# Patient Record
Sex: MALE | Race: NATIVE HAWAIIAN OR OTHER PACIFIC ISLANDER | NOT HISPANIC OR LATINO
[De-identification: names, ages, dates, MRNs, and addresses within clinical notes are randomized per-mention and may not be internally consistent; named-entity substitution may affect disease eponyms.]

---

## 2020-07-27 ENCOUNTER — APPOINTMENT (OUTPATIENT)
Dept: UROLOGY | Facility: CLINIC | Age: 50
End: 2020-07-27
Payer: COMMERCIAL

## 2020-07-27 VITALS
HEIGHT: 72 IN | WEIGHT: 195 LBS | RESPIRATION RATE: 14 BRPM | HEART RATE: 78 BPM | TEMPERATURE: 98.3 F | DIASTOLIC BLOOD PRESSURE: 86 MMHG | BODY MASS INDEX: 26.41 KG/M2 | SYSTOLIC BLOOD PRESSURE: 130 MMHG | OXYGEN SATURATION: 98 %

## 2020-07-27 DIAGNOSIS — Z83.3 FAMILY HISTORY OF DIABETES MELLITUS: ICD-10-CM

## 2020-07-27 DIAGNOSIS — Z82.49 FAMILY HISTORY OF ISCHEMIC HEART DISEASE AND OTHER DISEASES OF THE CIRCULATORY SYSTEM: ICD-10-CM

## 2020-07-27 DIAGNOSIS — Z86.79 PERSONAL HISTORY OF OTHER DISEASES OF THE CIRCULATORY SYSTEM: ICD-10-CM

## 2020-07-27 DIAGNOSIS — Z86.39 PERSONAL HISTORY OF OTHER ENDOCRINE, NUTRITIONAL AND METABOLIC DISEASE: ICD-10-CM

## 2020-07-27 PROCEDURE — 99204 OFFICE O/P NEW MOD 45 MIN: CPT

## 2020-07-27 RX ORDER — ATORVASTATIN CALCIUM 80 MG/1
TABLET, FILM COATED ORAL
Refills: 0 | Status: ACTIVE | COMMUNITY

## 2020-07-27 NOTE — REVIEW OF SYSTEMS
[Feeling Tired] : feeling tired [Chest Pain] : chest pain [Poor quality erections] : Poor quality erections [Heartburn] : heartburn [Wake up at night to urinate  How many times?  ___] : wakes up to urinate [unfilled] times during the night [Told you have blood in urine on a urine test] : told blood was present in a urine test [Joint Pain] : joint pain [Slow urine stream] : slow urine stream [Depression] : depression [Joint Swelling] : joint swelling [Limb Weakness] : limb weakness [Swollen Glands] : swollen glands [Muscle Weakness] : muscle weakness [Negative] : Integumentary [see HPI] : see HPI [Erectile Dysfunction] : erectile dysfunction

## 2020-07-27 NOTE — LETTER BODY
[Consult Letter:] : I had the pleasure of evaluating your patient, [unfilled]. [Dear  ___] : Dear  [unfilled], [FreeTextEntry1] : Address: [address] [place] [address] [address], [place] [address]\par Phone: [phone] [Please see my note below.] : Please see my note below.

## 2020-07-27 NOTE — HISTORY OF PRESENT ILLNESS
[FreeTextEntry1] : He is a 50-year-old man who is seen today for initial visit. Around February 2020, he underwent a CT scan for rib fracture. It showed a large prostate about 5 cm and normal kidneys. He has nocturia 2-3 times, urinary urgency, slow and interrupted urinary stream and sensation of double voiding. Residual urine today was 67 cc. He also has difficulty maintaining erections. Desire is normal. PSA level was 2.9 in July 2020. He has history of hyperlipidemia and hypertension.

## 2020-07-27 NOTE — ASSESSMENT
[FreeTextEntry1] : Based on office ultrasound he has enlarged prostate with an intravesical portion which could be contributing to his urinary symptoms. We had a long discussion regarding patient's urinary symptoms. Initial workup with cystoscopy, determination of urinary flow rate, post void residual volume and urodynamic study was discussed. We discussed conservative management without using medications such as controlling constipation, limiting fluids before bed-time, and limiting irritating substances such as coffee, tea, alcohol, spicy foods, etc. We also discussed medication therapy for treatment of urinary symptoms with alpha-blocker therapy (such as Flomax, Rapaflo), 5 alpha reductase inhibitors (such as Proscar and Avodart), Cialis 5 mg daily especially if ED is present, anticholinergic medications (such as VESIcare, Toviaz), Myrbetriq or a combination of the above medications. Treatment of overactive bladder symptoms with Botox intravesical injections was reviewed. Also surgical treatment options such as office microwave thermotherapy, photo-vaporization of the prostate (Greenlight laser), TURP or suprapubic prostatectomy based on the size of the prostate, were discussed and side effects reviewed. Questions were answered. For patients who also have complaints of erectile dysfunction, daily Cialis 5 mg may be suitable for treating both urinary symptoms and erectile dysfunction. A prescription was submitted to a local pharmacy. Side effects reviewed. Followup in 2-3 months to assess response.\par \par Vance Rivera MD, FACS\par [facility]\par  of Urology\par \par [address], [address]\[place] [address]\par \par [address]\[place] [address]\par \par Tel: [phone]\par Fax: [phone]

## 2020-07-27 NOTE — PHYSICAL EXAM
[General Appearance - Well Developed] : well developed [General Appearance - Well Nourished] : well nourished [Normal Appearance] : normal appearance [Well Groomed] : well groomed [Edema] : no peripheral edema [General Appearance - In No Acute Distress] : no acute distress [Respiration, Rhythm And Depth] : normal respiratory rhythm and effort [Exaggerated Use Of Accessory Muscles For Inspiration] : no accessory muscle use [Abdomen Soft] : soft [Costovertebral Angle Tenderness] : no ~M costovertebral angle tenderness [Urethral Meatus] : meatus normal [Abdomen Tenderness] : non-tender [Scrotum] : the scrotum was normal [Urinary Bladder Findings] : the bladder was normal on palpation [Penis Abnormality] : normal uncircumcised penis [Prostate Tenderness] : the prostate was not tender [Testes Tenderness] : no tenderness of the testes [Testes Mass (___cm)] : there were no testicular masses [No Prostate Nodules] : no prostate nodules [Prostate Enlarged] : was enlarged [] : no rash [Normal Station and Gait] : the gait and station were normal for the patient's age [Affect] : the affect was normal [No Focal Deficits] : no focal deficits [Oriented To Time, Place, And Person] : oriented to person, place, and time [Not Anxious] : not anxious [Mood] : the mood was normal [Inguinal Lymph Nodes Enlarged Bilaterally] : inguinal

## 2020-10-12 ENCOUNTER — APPOINTMENT (OUTPATIENT)
Dept: UROLOGY | Facility: CLINIC | Age: 50
End: 2020-10-12
Payer: COMMERCIAL

## 2020-10-12 VITALS
OXYGEN SATURATION: 96 % | HEIGHT: 72 IN | TEMPERATURE: 98 F | BODY MASS INDEX: 26.41 KG/M2 | SYSTOLIC BLOOD PRESSURE: 144 MMHG | HEART RATE: 77 BPM | DIASTOLIC BLOOD PRESSURE: 94 MMHG | RESPIRATION RATE: 15 BRPM | WEIGHT: 195 LBS

## 2020-10-12 PROCEDURE — 99213 OFFICE O/P EST LOW 20 MIN: CPT

## 2020-10-12 NOTE — ASSESSMENT
[FreeTextEntry1] : urinary symptoms have improved significantly. Residual urine volume is stable. Also he has noticed change in erections. He could increase Cialis to 10 mg when sexually active and skip the next day. He can follow up in 6 months. He will continue with Cialis 5 mg daily for now. Initially he had noticed some nausea which has improved.\par \par Vance Rivera MD, FACS\par [facility]\par  of Urology\par [address] [place] [address], [address]\[place] [address]\par Tel: [phone]\par Fax: [phone]\par

## 2020-10-12 NOTE — LETTER BODY
[Dear  ___] : Dear  [unfilled], [Consult Letter:] : I had the pleasure of evaluating your patient, [unfilled]. [Please see my note below.] : Please see my note below. [FreeTextEntry1] : Address: [address] [place] [address] [address], [place] [address]\par Phone: [phone]

## 2020-10-12 NOTE — HISTORY OF PRESENT ILLNESS
[FreeTextEntry1] : He is a 50-year-old man who is seen today in follow-up. Around July 2020, he was started on Cialis 5 mg daily. Nocturia has improved significantly. He does not get up to urinate any longer. Urinary flow and stream has improved. Residual urine today was unchanged, 60 cc. Also he has noticed spontaneous erections.\par Previous notes: Around February 2020, he underwent a CT scan for rib fracture. It showed a large prostate about 5 cm and normal kidneys. He had nocturia 2-3 times, urinary urgency, slow and interrupted urinary stream and sensation of double voiding. He also has difficulty maintaining erections. Desire is normal. PSA level was 2.9 in July 2020. He has history of hyperlipidemia and hypertension.

## 2020-10-12 NOTE — PHYSICAL EXAM
[General Appearance - Well Developed] : well developed [General Appearance - Well Nourished] : well nourished [Normal Appearance] : normal appearance [Well Groomed] : well groomed [General Appearance - In No Acute Distress] : no acute distress [Abdomen Soft] : soft [Abdomen Tenderness] : non-tender [Costovertebral Angle Tenderness] : no ~M costovertebral angle tenderness [Urethral Meatus] : meatus normal [Penis Abnormality] : normal uncircumcised penis [Urinary Bladder Findings] : the bladder was normal on palpation [Scrotum] : the scrotum was normal [Testes Tenderness] : no tenderness of the testes [Testes Mass (___cm)] : there were no testicular masses [Prostate Tenderness] : the prostate was not tender [No Prostate Nodules] : no prostate nodules [Prostate Enlarged] : was enlarged [FreeTextEntry1] : SOFÍA done July 2020 [] : no respiratory distress [Respiration, Rhythm And Depth] : normal respiratory rhythm and effort [Exaggerated Use Of Accessory Muscles For Inspiration] : no accessory muscle use [Oriented To Time, Place, And Person] : oriented to person, place, and time [Affect] : the affect was normal [Mood] : the mood was normal [Not Anxious] : not anxious

## 2021-04-14 ENCOUNTER — APPOINTMENT (OUTPATIENT)
Dept: UROLOGY | Facility: CLINIC | Age: 51
End: 2021-04-14
Payer: COMMERCIAL

## 2021-04-14 VITALS
RESPIRATION RATE: 14 BRPM | HEART RATE: 62 BPM | WEIGHT: 195 LBS | HEIGHT: 72 IN | TEMPERATURE: 98 F | OXYGEN SATURATION: 99 % | SYSTOLIC BLOOD PRESSURE: 153 MMHG | DIASTOLIC BLOOD PRESSURE: 94 MMHG | BODY MASS INDEX: 26.41 KG/M2

## 2021-04-14 PROCEDURE — 99072 ADDL SUPL MATRL&STAF TM PHE: CPT

## 2021-04-14 PROCEDURE — 99213 OFFICE O/P EST LOW 20 MIN: CPT

## 2021-04-14 NOTE — PHYSICAL EXAM
[General Appearance - Well Developed] : well developed [General Appearance - Well Nourished] : well nourished [Normal Appearance] : normal appearance [Well Groomed] : well groomed [General Appearance - In No Acute Distress] : no acute distress [Abdomen Soft] : soft [Abdomen Tenderness] : non-tender [Costovertebral Angle Tenderness] : no ~M costovertebral angle tenderness [Urethral Meatus] : meatus normal [Penis Abnormality] : normal uncircumcised penis [Urinary Bladder Findings] : the bladder was normal on palpation [Scrotum] : the scrotum was normal [Testes Tenderness] : no tenderness of the testes [Testes Mass (___cm)] : there were no testicular masses [Prostate Tenderness] : the prostate was not tender [No Prostate Nodules] : no prostate nodules [Prostate Enlarged] : was enlarged [FreeTextEntry1] : SOFÍA done July 2020.

## 2021-04-14 NOTE — ASSESSMENT
[FreeTextEntry1] : He seems to be doing well specially from urinary standpoint with Cialis 5 mg daily.  Prescription was refilled for him.  He is due for repeat labs by his primary care physician in the next few months with his annual physical.  He could increase Cialis to 10 to 20 mg when sexually active and skip the next day.  Follow-up in 6 to 9 months.\par \par Vance Rivera MD, FACS\par [facility]\par  of Urology\par \par [address], [address]\[place] [address]\par \par [address] [facility], [address]\[place] [address]\par \par Tel: [phone]\par Fax: [phone]

## 2021-04-14 NOTE — LETTER BODY
[Dear  ___] : Dear  [unfilled], [Consult Letter:] : I had the pleasure of evaluating your patient, [unfilled]. [Please see my note below.] : Please see my note below. [FreeTextEntry1] : Address: [address] [place] [address] [address], [place] [address]\par Phone: [phone]

## 2021-11-17 ENCOUNTER — APPOINTMENT (OUTPATIENT)
Dept: UROLOGY | Facility: CLINIC | Age: 51
End: 2021-11-17
Payer: COMMERCIAL

## 2021-11-17 VITALS
OXYGEN SATURATION: 100 % | RESPIRATION RATE: 15 BRPM | SYSTOLIC BLOOD PRESSURE: 170 MMHG | DIASTOLIC BLOOD PRESSURE: 96 MMHG | WEIGHT: 195 LBS | HEIGHT: 72 IN | HEART RATE: 65 BPM | TEMPERATURE: 97.6 F | BODY MASS INDEX: 26.41 KG/M2

## 2021-11-17 DIAGNOSIS — Z87.898 PERSONAL HISTORY OF OTHER SPECIFIED CONDITIONS: ICD-10-CM

## 2021-11-17 PROCEDURE — 99213 OFFICE O/P EST LOW 20 MIN: CPT

## 2021-11-17 PROCEDURE — 51736 URINE FLOW MEASUREMENT: CPT

## 2021-11-17 RX ORDER — AMLODIPINE AND OLMESARTAN MEDOXOMIL 10; 40 MG/1; MG/1
TABLET ORAL
Refills: 0 | Status: DISCONTINUED | COMMUNITY
End: 2021-11-17

## 2021-11-17 RX ORDER — LISINOPRIL 30 MG/1
TABLET ORAL
Refills: 0 | Status: DISCONTINUED | COMMUNITY
End: 2021-11-17

## 2021-11-17 RX ORDER — VALSARTAN 160 MG/1
160 TABLET, COATED ORAL
Refills: 0 | Status: ACTIVE | COMMUNITY

## 2021-11-17 NOTE — PHYSICAL EXAM
[General Appearance - Well Developed] : well developed [General Appearance - Well Nourished] : well nourished [Normal Appearance] : normal appearance [Well Groomed] : well groomed [General Appearance - In No Acute Distress] : no acute distress [Abdomen Soft] : soft [Abdomen Tenderness] : non-tender [Costovertebral Angle Tenderness] : no ~M costovertebral angle tenderness [Urethral Meatus] : meatus normal [Penis Abnormality] : normal uncircumcised penis [Urinary Bladder Findings] : the bladder was normal on palpation [Scrotum] : the scrotum was normal [Testes Tenderness] : no tenderness of the testes [Testes Mass (___cm)] : there were no testicular masses [Prostate Tenderness] : the prostate was not tender [No Prostate Nodules] : no prostate nodules [Prostate Enlarged] : was enlarged [FreeTextEntry1] : SOFÍA done in 2020. [] : no respiratory distress [Respiration, Rhythm And Depth] : normal respiratory rhythm and effort [Exaggerated Use Of Accessory Muscles For Inspiration] : no accessory muscle use [Oriented To Time, Place, And Person] : oriented to person, place, and time [Affect] : the affect was normal [Mood] : the mood was normal [Not Anxious] : not anxious

## 2021-11-17 NOTE — LETTER BODY
[Dear  ___] : Dear  [unfilled], [Consult Letter:] : I had the pleasure of evaluating your patient, [unfilled]. [Please see my note below.] : Please see my note below. [FreeTextEntry1] : Address: [address] [place] [address] [address], [place] [address]\par Phone: [phone]

## 2021-11-17 NOTE — ASSESSMENT
[FreeTextEntry1] : He will send me a copy of his recent blood work.  He is trying to get his blood pressure under control.  Previous PSA level was reviewed.  Tadalafil was renewed for him and he will continue with 5 mg daily.  Uroflow was discussed.  He can follow-up in 6 to 9 months.\par \par Vance Rivera MD, FACS\par [facility]\par  of Urology\par \par [address] [facility], [address]\[place] [address]\par \par [address] [facility], [address]\[place] [address]\par \par Tel: [phone]\par Fax: [phone]

## 2021-11-17 NOTE — HISTORY OF PRESENT ILLNESS
[FreeTextEntry1] : He is a 51-year-old man who is seen today in follow-up for urinary symptoms and erectile dysfunction.  He has not been sexually very active recently.  He is on tadalafil 5 mg daily.  Nocturia is once.  Sometimes he urinates frequently in the morning.  He believes that recent blood work for his annual physical was normal.  Uroflow today showed maximum 8, average 4 mL/s, voided 160 cc and the residual urine volume was 120 cc.  Tadalafil has improved erections.\par \par Previous notes: Around February 2020, he underwent a CT scan for rib fracture. It showed a large prostate about 5 cm and normal kidneys. He had nocturia 2-3 times, urinary urgency, slow and interrupted urinary stream and sensation of double voiding. He also had difficulty maintaining erections. Desire is normal. PSA level was 2.9 in July 2020. He has history of hyperlipidemia and hypertension.

## 2022-08-24 ENCOUNTER — APPOINTMENT (OUTPATIENT)
Dept: UROLOGY | Facility: CLINIC | Age: 52
End: 2022-08-24

## 2022-08-24 PROCEDURE — 99213 OFFICE O/P EST LOW 20 MIN: CPT

## 2022-08-24 NOTE — PHYSICAL EXAM
[Urethral Meatus] : meatus normal [Penis Abnormality] : normal uncircumcised penis [Urinary Bladder Findings] : the bladder was normal on palpation [Scrotum] : the scrotum was normal [Testes Tenderness] : no tenderness of the testes [Testes Mass (___cm)] : there were no testicular masses [Prostate Tenderness] : the prostate was not tender [No Prostate Nodules] : no prostate nodules [Prostate Enlarged] : was enlarged [General Appearance - Well Developed] : well developed [General Appearance - Well Nourished] : well nourished [Normal Appearance] : normal appearance [Well Groomed] : well groomed [General Appearance - In No Acute Distress] : no acute distress [Abdomen Soft] : soft [Abdomen Tenderness] : non-tender [Costovertebral Angle Tenderness] : no ~M costovertebral angle tenderness [FreeTextEntry1] : Prostate mildly enlarged. [] : no respiratory distress [Respiration, Rhythm And Depth] : normal respiratory rhythm and effort [Exaggerated Use Of Accessory Muscles For Inspiration] : no accessory muscle use [Oriented To Time, Place, And Person] : oriented to person, place, and time [Affect] : the affect was normal [Mood] : the mood was normal [Not Anxious] : not anxious

## 2022-08-24 NOTE — LETTER BODY
[Dear  ___] : Dear  [unfilled], [Consult Letter:] : I had the pleasure of evaluating your patient, [unfilled]. [Please see my note below.] : Please see my note below. [FreeTextEntry1] : Address: [address] [place] [address] [address], [place] [address]\par Phone: [phone]

## 2022-08-24 NOTE — HISTORY OF PRESENT ILLNESS
[FreeTextEntry1] : He is a 52-year-old man who is seen today in follow-up for urinary symptoms and erectile dysfunction.  He continues to respond well to tadalafil 5 mg daily.  He has no nocturia now.  Residual urine volume today was less than 50 mL.  He is repeating PSA level today.  Tadalafil is helping with urination and also erectile dysfunction.\par Uroflow previously showed maximum 8, average 4 mL/s, voided 160 cc and the residual urine volume was 120 cc. \par \par Previous notes: Around February 2020, he underwent a CT scan for rib fracture. It showed a large prostate about 5 cm and normal kidneys. He had nocturia 2-3 times, urinary urgency, slow and interrupted urinary stream and sensation of double voiding. He also had difficulty maintaining erections. Desire is normal. PSA level was 2.9 in July 2020. He has history of hyperlipidemia and hypertension.

## 2022-08-24 NOTE — ASSESSMENT
[FreeTextEntry1] : His examination is unchanged.  Residual urine volume is acceptable.  He is responding very well to tadalafil 5 mg daily.  Prescription was refilled.  He will follow-up in 6 to 9 months.\par \par Vance Rivera MD, FACS\par [facility]\par  of Urology\par \par [address] [facility] [facility], [address]\[place] [address]\par \par [address] [facility], [address]\[place] [address]\par \par Tel: [phone]\par Fax: [phone]

## 2022-08-25 LAB — PSA SERPL-MCNC: 4.25 NG/ML

## 2022-11-03 ENCOUNTER — OUTPATIENT (OUTPATIENT)
Dept: OUTPATIENT SERVICES | Facility: HOSPITAL | Age: 52
LOS: 1 days | End: 2022-11-03
Payer: SELF-PAY

## 2022-11-03 ENCOUNTER — RESULT REVIEW (OUTPATIENT)
Age: 52
End: 2022-11-03

## 2022-11-03 ENCOUNTER — APPOINTMENT (OUTPATIENT)
Dept: MRI IMAGING | Facility: IMAGING CENTER | Age: 52
End: 2022-11-03

## 2022-11-03 DIAGNOSIS — R97.20 ELEVATED PROSTATE SPECIFIC ANTIGEN [PSA]: ICD-10-CM

## 2022-11-03 PROCEDURE — 76498P: CUSTOM | Mod: 26

## 2022-11-03 PROCEDURE — 72197 MRI PELVIS W/O & W/DYE: CPT

## 2022-11-03 PROCEDURE — 72197 MRI PELVIS W/O & W/DYE: CPT | Mod: 26

## 2022-11-03 PROCEDURE — A9585: CPT

## 2022-11-03 PROCEDURE — 76498 UNLISTED MR PROCEDURE: CPT

## 2022-11-07 ENCOUNTER — NON-APPOINTMENT (OUTPATIENT)
Age: 52
End: 2022-11-07

## 2023-04-10 ENCOUNTER — EMERGENCY (EMERGENCY)
Facility: HOSPITAL | Age: 53
LOS: 1 days | Discharge: HOME | End: 2023-04-10
Attending: EMERGENCY MEDICINE | Admitting: EMERGENCY MEDICINE
Payer: COMMERCIAL

## 2023-04-10 VITALS
DIASTOLIC BLOOD PRESSURE: 87 MMHG | TEMPERATURE: 99 F | RESPIRATION RATE: 18 BRPM | HEART RATE: 80 BPM | OXYGEN SATURATION: 100 % | SYSTOLIC BLOOD PRESSURE: 145 MMHG

## 2023-04-10 LAB
ALBUMIN SERPL ELPH-MCNC: 4.3 G/DL — SIGNIFICANT CHANGE UP (ref 3.3–5)
ALP SERPL-CCNC: 107 U/L — SIGNIFICANT CHANGE UP (ref 40–120)
ALT FLD-CCNC: 30 U/L — SIGNIFICANT CHANGE UP (ref 4–41)
ANION GAP SERPL CALC-SCNC: 10 MMOL/L — SIGNIFICANT CHANGE UP (ref 7–14)
APTT BLD: 30.2 SEC — SIGNIFICANT CHANGE UP (ref 27–36.3)
AST SERPL-CCNC: 19 U/L — SIGNIFICANT CHANGE UP (ref 4–40)
BILIRUB SERPL-MCNC: 0.3 MG/DL — SIGNIFICANT CHANGE UP (ref 0.2–1.2)
BUN SERPL-MCNC: 18 MG/DL — SIGNIFICANT CHANGE UP (ref 7–23)
CALCIUM SERPL-MCNC: 9.4 MG/DL — SIGNIFICANT CHANGE UP (ref 8.4–10.5)
CHLORIDE SERPL-SCNC: 107 MMOL/L — SIGNIFICANT CHANGE UP (ref 98–107)
CO2 SERPL-SCNC: 24 MMOL/L — SIGNIFICANT CHANGE UP (ref 22–31)
CREAT SERPL-MCNC: 1.25 MG/DL — SIGNIFICANT CHANGE UP (ref 0.5–1.3)
EGFR: 69 ML/MIN/1.73M2 — SIGNIFICANT CHANGE UP
GLUCOSE SERPL-MCNC: 88 MG/DL — SIGNIFICANT CHANGE UP (ref 70–99)
HCT VFR BLD CALC: 47.9 % — SIGNIFICANT CHANGE UP (ref 39–50)
HGB BLD-MCNC: 15.1 G/DL — SIGNIFICANT CHANGE UP (ref 13–17)
INR BLD: 1 RATIO — SIGNIFICANT CHANGE UP (ref 0.88–1.16)
MCHC RBC-ENTMCNC: 25.9 PG — LOW (ref 27–34)
MCHC RBC-ENTMCNC: 31.5 GM/DL — LOW (ref 32–36)
MCV RBC AUTO: 82.2 FL — SIGNIFICANT CHANGE UP (ref 80–100)
NRBC # BLD: 0 /100 WBCS — SIGNIFICANT CHANGE UP (ref 0–0)
NRBC # FLD: 0 K/UL — SIGNIFICANT CHANGE UP (ref 0–0)
PLATELET # BLD AUTO: 304 K/UL — SIGNIFICANT CHANGE UP (ref 150–400)
POTASSIUM SERPL-MCNC: 3.9 MMOL/L — SIGNIFICANT CHANGE UP (ref 3.5–5.3)
POTASSIUM SERPL-SCNC: 3.9 MMOL/L — SIGNIFICANT CHANGE UP (ref 3.5–5.3)
PROT SERPL-MCNC: 7.2 G/DL — SIGNIFICANT CHANGE UP (ref 6–8.3)
PROTHROM AB SERPL-ACNC: 11.6 SEC — SIGNIFICANT CHANGE UP (ref 10.5–13.4)
RBC # BLD: 5.83 M/UL — HIGH (ref 4.2–5.8)
RBC # FLD: 14.5 % — SIGNIFICANT CHANGE UP (ref 10.3–14.5)
SARS-COV-2 RNA SPEC QL NAA+PROBE: SIGNIFICANT CHANGE UP
SODIUM SERPL-SCNC: 141 MMOL/L — SIGNIFICANT CHANGE UP (ref 135–145)
TROPONIN T, HIGH SENSITIVITY RESULT: 7 NG/L — SIGNIFICANT CHANGE UP
WBC # BLD: 8.28 K/UL — SIGNIFICANT CHANGE UP (ref 3.8–10.5)
WBC # FLD AUTO: 8.28 K/UL — SIGNIFICANT CHANGE UP (ref 3.8–10.5)

## 2023-04-10 PROCEDURE — 71275 CT ANGIOGRAPHY CHEST: CPT | Mod: 26,MA

## 2023-04-10 PROCEDURE — 74174 CTA ABD&PLVS W/CONTRAST: CPT | Mod: 26,MA

## 2023-04-10 PROCEDURE — 99223 1ST HOSP IP/OBS HIGH 75: CPT

## 2023-04-10 PROCEDURE — 93010 ELECTROCARDIOGRAM REPORT: CPT

## 2023-04-10 PROCEDURE — 93971 EXTREMITY STUDY: CPT | Mod: 26,LT

## 2023-04-10 RX ORDER — OXYCODONE HYDROCHLORIDE 5 MG/1
5 TABLET ORAL ONCE
Refills: 0 | Status: DISCONTINUED | OUTPATIENT
Start: 2023-04-10 | End: 2023-04-10

## 2023-04-10 RX ORDER — AMLODIPINE BESYLATE 2.5 MG/1
10 TABLET ORAL DAILY
Refills: 0 | Status: DISCONTINUED | OUTPATIENT
Start: 2023-04-10 | End: 2023-04-13

## 2023-04-10 RX ORDER — ASPIRIN/CALCIUM CARB/MAGNESIUM 324 MG
325 TABLET ORAL ONCE
Refills: 0 | Status: COMPLETED | OUTPATIENT
Start: 2023-04-10 | End: 2023-04-10

## 2023-04-10 RX ORDER — VALSARTAN 80 MG/1
60 TABLET ORAL DAILY
Refills: 0 | Status: DISCONTINUED | OUTPATIENT
Start: 2023-04-10 | End: 2023-04-13

## 2023-04-10 RX ORDER — ASPIRIN/CALCIUM CARB/MAGNESIUM 324 MG
81 TABLET ORAL DAILY
Refills: 0 | Status: DISCONTINUED | OUTPATIENT
Start: 2023-04-11 | End: 2023-04-13

## 2023-04-10 RX ADMIN — Medication 325 MILLIGRAM(S): at 14:06

## 2023-04-10 RX ADMIN — OXYCODONE HYDROCHLORIDE 5 MILLIGRAM(S): 5 TABLET ORAL at 22:54

## 2023-04-10 RX ADMIN — OXYCODONE HYDROCHLORIDE 5 MILLIGRAM(S): 5 TABLET ORAL at 21:45

## 2023-04-10 NOTE — ED CDU PROVIDER INITIAL DAY NOTE - CARDIAC, MLM
Normal rate, regular rhythm.  Heart sounds S1, S2.  No murmurs, rubs or gallops. No LE edema. No calf ttp.

## 2023-04-10 NOTE — ED PROVIDER NOTE - NSICDXFAMILYHX_GEN_ALL_CORE_FT
FAMILY HISTORY:  Father  Still living? Unknown  Family history of diabetes mellitus type II, Age at diagnosis: Age Unknown

## 2023-04-10 NOTE — ED ADULT NURSE NOTE - OBJECTIVE STATEMENT
53 year old male received to intake 10C, AAOx4 ambulatory. PMH HTN, thoracic aortic aneurysm, cardiomegaly. Pt c/o worsening left sided chest pain radiating to right lower back x 2 wks. Pt with follow up appt with cardiologist May 18th. Pt rates pain 8/10 taking motrin with partial relief. Pt endorses swelling to left ankle if he ambulates a lot, no swelling noted to BLLE at this time. Pt denies shortness of breath, dyspnea on exertion, dizziness, headache, n/v/d, fever. Respirations even and unlabored. PIV #20 left AC, labs drawn and sent. Bed in lowest position, safety maintained.

## 2023-04-10 NOTE — ED CDU PROVIDER INITIAL DAY NOTE - ATTENDING APP SHARED VISIT CONTRIBUTION OF CARE
Patient with atypical cp, neg CTPA, with risk factors for CAD.echo stress cardiology consult.  WEll appearing NAD HEENT nml heart s1s, lungs clear, abd soft nontender, no chest wall tenderness.

## 2023-04-10 NOTE — ED PROVIDER NOTE - CLINICAL SUMMARY MEDICAL DECISION MAKING FREE TEXT BOX
56-year-old male with hypertension and a known thoracic aortic aneurysm 4.7 cm complaining of chest rating to the back pain worsened in any clinical movement much worse over the last 2 weeks.  We will get labs troponin CT chest.

## 2023-04-10 NOTE — ED CDU PROVIDER INITIAL DAY NOTE - CLINICAL SUMMARY MEDICAL DECISION MAKING FREE TEXT BOX
53-year-old Citizen of [place] male with past medical history of hypertension presents to ED complaining of left-sided chest pain for 2 weeks.  Patient states he cannot describe the pain, however pain is intermittent, radiates to his back, exertional and not pleuritic.  States he has had chronic back pain although this back pain feels different.  Has also had chronic right foot swelling intermittently for the past few months. no pe risk factors. EKG NSR. sent to cdu for r/o ACS, tele monitoring, stress test and echo.

## 2023-04-10 NOTE — ED CDU PROVIDER INITIAL DAY NOTE - OBJECTIVE STATEMENT
53-year-old [place] male with past medical history of hypertension presents to ED complaining of left-sided chest pain for 2 weeks.  Patient states he cannot describe the pain, however pain is intermittent, radiates to his back, exertional and not pleuritic.  States he has had chronic back pain although this back pain feels different.  Has also had chronic right foot swelling intermittently for the past few months.  Patient denies any shortness of breath, diaphoresis, dizziness, syncope, nausea vomiting, fevers or chills.  Patient has a cardiologist at [facility].  Does not smoke. No recent travel, surgeries, hospitalizations, le edema, calf pain, hx of dvt/pe.

## 2023-04-10 NOTE — ED PROVIDER NOTE - OBJECTIVE STATEMENT
53-year-old patient with hypertension complaining of left-sided chest pain rating to the back severe over the last 2 weeks.  Pain mostly with movement.  Patient brings in a study done at another hospital in January which showed a 4.7 cm thoracic thoracic aortic aneurysm and cardiocardiomegaly.  Has not had follow-up for this yet.  Patient has pain whenever he moves or does anything.  Also has an old history of a back injury 20 years ago, and a fractured rib about a year ago on the left side.  Patient denies shortness of breath, no nausea vomiting. 53-year-old patient with hypertension complaining of left-sided chest pain rating to the back, started in January but became severe over the last 2 weeks.  Pain mostly with movement.  Patient brings in a study done at another hospital in January which showed a 4.7 cm thoracic thoracic aortic aneurysm and cardiocardiomegaly.  Has not had follow-up for this yet.  Patient has pain whenever he moves or does anything.  Also has an old history of a back injury 20 years ago, and a fractured rib about a year ago on the left side.  Patient denies shortness of breath, no nausea vomiting.

## 2023-04-10 NOTE — ED PROVIDER NOTE - PHYSICAL EXAMINATION
Well-appearing no acute distress mildly anxious  HEENT normal  Carotid pulses equal without bruits  Neck supple no C-spine tenderness  Chest no tenderness heart sounds S1-S2 no murmurs gallops or rubs  Back no spinal tenderness no muscular tenderness on the back  Clear  Abdomen soft nontender there is no palpable mass  Extremities no edema pulses 2+ and equal bilaterally.  Neuro motor 5/5 sensation intact

## 2023-04-11 VITALS
SYSTOLIC BLOOD PRESSURE: 120 MMHG | RESPIRATION RATE: 17 BRPM | DIASTOLIC BLOOD PRESSURE: 84 MMHG | OXYGEN SATURATION: 100 % | HEART RATE: 92 BPM | TEMPERATURE: 99 F

## 2023-04-11 PROCEDURE — 99238 HOSP IP/OBS DSCHRG MGMT 30/<: CPT

## 2023-04-11 PROCEDURE — 93306 TTE W/DOPPLER COMPLETE: CPT | Mod: 26

## 2023-04-11 PROCEDURE — 99284 EMERGENCY DEPT VISIT MOD MDM: CPT

## 2023-04-11 PROCEDURE — 93016 CV STRESS TEST SUPVJ ONLY: CPT | Mod: GC

## 2023-04-11 PROCEDURE — 93018 CV STRESS TEST I&R ONLY: CPT | Mod: GC

## 2023-04-11 PROCEDURE — 78452 HT MUSCLE IMAGE SPECT MULT: CPT | Mod: 26,MA

## 2023-04-11 RX ADMIN — AMLODIPINE BESYLATE 10 MILLIGRAM(S): 2.5 TABLET ORAL at 05:37

## 2023-04-11 RX ADMIN — Medication 81 MILLIGRAM(S): at 12:44

## 2023-04-11 RX ADMIN — VALSARTAN 60 MILLIGRAM(S): 80 TABLET ORAL at 05:37

## 2023-04-11 NOTE — CONSULT NOTE ADULT - SUBJECTIVE AND OBJECTIVE BOX
Cardiology/Vascular Medicine Inpatient Consultation Note    HISTORY OF PRESENT ILLNESS:    Patient is a 54 yo man who presents with complaints of left-sided chest pain over the past 2 weeks.    He has a history of hypertension.  He has noted having left-sided chest pain over the prior 2 weeks, which has been intermittent, and radiates to his back.  Notes having an exertional component to his symptoms although not all the time.    12-lead ECG and bedside telemetry --> SR, no events  Cardiac biomarkers flat.    In the ED, patient underwent imaging. CTA chest noted stable aortic root at SOV at 4.0 cm.  No evidence of other aneurysms in chest or abdomen noted.  No evidence of intramural hematoma or dissection.    LE venous duplex US also negative for DVT.    At the time of my evaluation, the patient appeared clinically and hemodynamically stable although BPs were labile/borderline-low.  He was asymptomatic at the time of my evaluation.  Unremarkable physical exam.  He appeared euvolemic on my exam.    Patient already scheduled for echocardiogram and nuclear stress test by the CDU team.    Continue to monitor on cardiac telemetry.  Will monitor BPs -- ideal -120 mmHg in setting of known aneurysm.  Echocardiogram and ischemic evaluation pending.  Will follow.    Allergies  No Known Allergies    MEDICATIONS:  amLODIPine   Tablet 10 milliGRAM(s) Oral daily  aspirin  chewable 81 milliGRAM(s) Oral daily  valsartan 60 milliGRAM(s) Oral daily    PAST MEDICAL & SURGICAL HISTORY:  GERD (gastroesophageal reflux disease)    No significant past surgical history    FAMILY HISTORY:  Family history of diabetes mellitus type II (Father)    SOCIAL HISTORY:    As above, as per chart notes    REVIEW OF SYSTEMS:  As above, as per chart notes    PHYSICAL EXAM:  T(C): 36.4 (04-11-23 @ 10:50), Max: 36.6 (04-11-23 @ 05:39)  HR: 67 (04-11-23 @ 10:50) (59 - 71)  BP: 140/94 (04-11-23 @ 10:50) (102/61 - 141/86)  RR: 18 (04-11-23 @ 10:50) (14 - 18)  SpO2: 100% (04-11-23 @ 10:50) (100% - 100%)    Appearance: NAD  HEENT:   No JVD  Cardiovascular: Normal S1 S2, No JVD, No murmurs, No edema  Respiratory: Decreased breath sounds bilaterally  Psychiatry: Awake, alert  Gastrointestinal:  Soft, Non-tender, + BS	  Neurologic: Non-focal  Extremities: No edema  Vascular: Peripheral pulses palpable 2+ bilaterally      LABS:	 	    CBC Full  -  ( 10 Apr 2023 12:22 )  WBC Count : 8.28 K/uL  Hemoglobin : 15.1 g/dL  Hematocrit : 47.9 %  Platelet Count - Automated : 304 K/uL  Mean Cell Volume : 82.2 fL  Mean Cell Hemoglobin : 25.9 pg  Mean Cell Hemoglobin Concentration : 31.5 gm/dL  Auto Neutrophil # : x  Auto Lymphocyte # : x  Auto Monocyte # : x  Auto Eosinophil # : x  Auto Basophil # : x  Auto Neutrophil % : x  Auto Lymphocyte % : x  Auto Monocyte % : x  Auto Eosinophil % : x  Auto Basophil % : x    04-10    141  |  107  |  18  ----------------------------<  88  3.9   |  24  |  1.25    Ca    9.4      10 Apr 2023 12:22    TPro  7.2  /  Alb  4.3  /  TBili  0.3  /  DBili  x   /  AST  19  /  ALT  30  /  AlkPhos  107  04-10    rad< from: CT Angio Chest Aorta w/wo IV Cont (04.10.23 @ 12:38) >    ACC: [id number] EXAM:  CT ANGIO CHEST AORTA WAWI   ORDERED BY: HELEN BLOCH     PROCEDURE DATE:  04/10/2023          INTERPRETATION:  Reason for Exam: Dilated aorta    CTA of the chest was performed from the thoracic inlet to the level of   the adrenal glands following IV contrast injection of  80 cc of Omnipaque   350. No immediate complications were reported.  MIP images were also   created and reviewed. Precontrast images were also obtained.    Comparison: 9/14/2015 CT chest    Tubes/Lines: None    Mediastinum and Heart: Pulmonary arteries are normal in size. Thyroid   gland is unremarkable. No lymphadenopathy. No pericardial effusion.    Aortic measurements as follows (using double-oblique technique):  Sinuses of Valsalva: 3.9 x 3.9 x 4 cm  Sinotubular junction: 3.5 x 3.7 cm  Ascending aorta: 3.7 x 3.9 cm  Aortic arch: 2.3 x 2.5 cm  Descending thoracic aorta: 2.3 x 2.1 cm    No intramural hematoma or dissection. These measurements are without   significant change from previous exam given differences in technique.      Lungs, Pleura, and Airways: There is no pulmonary embolus. No   consolidations, edema, effusion, or pneumothorax.    Visualized Abdomen: Unremarkable.    Bones and soft tissues: Unremarkable.    IMPRESSION:    Aorticmeasurements as follows (using double-oblique technique):  Sinuses of Valsalva: 3.9 x 3.9 x 4 cm  Sinotubular junction: 3.5 x 3.7 cm  Ascending aorta: 3.7 x 3.9 cm  Aortic arch: 2.3 x 2.5 cm  Descending thoracic aorta: 2.3 x 2.1 cm      These measurements are without significant change from previous exam   given differences in technique.    No intramural hematoma or dissection.    --- End of Report ---            DORCAS PILLAI MD; Attending Radiologist  This document has been electronically signed. Apr 10 2023  1:20PM    < end of copied text >  < from: US Duplex Venous Lower Ext Ltd, Right (04.10.23 @ 19:59) >    ACC: [id number] EXAM:  US DPLX LWR EXT VEINS LTD RT   ORDERED BY: CLIFF SAMUEL     PROCEDURE DATE:  04/10/2023          INTERPRETATION:  CLINICAL INFORMATION: Right leg pain.    COMPARISON: None available.    TECHNIQUE: Duplex sonography of the RIGHT LOWER extremity veins with   color and spectral Doppler, with and without compression.    FINDINGS:    There is normal compressibility of the right common femoral, femoral and   popliteal veins.  The contralateral common femoral vein is patent.  Doppler examination shows normal spontaneous and phasic flow.    No calf vein thrombosis is detected.    IMPRESSION:  No evidence of right lower extremity deep venous thrombosis.          --- End of Report ---            ANGEL NOVAK MD; Attending Radiologist  This document has been electronically signed. Apr 10 2023  8:04PM    < end of copied text >  < from: CT Angio Abdomen and Pelvis w/ IV Cont (04.10.23 @ 23:34) >  ACC: [id number] EXAM:  CT ANGIO ABD PELV (W)AW IC   ORDERED BY: MARJORIE ORTIZ     PROCEDURE DATE:  04/10/2023          INTERPRETATION:  CLINICAL INFORMATION: Left-sided chest pain radiating to   the back    COMPARISON: CT abdomen/pelvis 3/8/2010 and CTA chest 4/10/2023    CONTRAST/COMPLICATIONS:  IV Contrast: Omnipaque 350  90 cc administered   10 cc discarded  Oral Contrast: NONE  Complications: None reported at time of study completion    PROCEDURE:  CT Angiography of the Abdomen and Pelvis.  Arterial phase images were acquired.  Sagittal and coronal reformats were performed as well as 3D (MIP)   reconstructions.    FINDINGS:  LOWER CHEST: Within normal limits.    LIVER: Segment 6 heterogeneous 1.1 cm lesion with areas of hyperdensity   (2:43), likely hemangioma.  BILE DUCTS: Normal caliber.  GALLBLADDER: Vicarious excretion of contrast.  SPLEEN: Within normal limits.  PANCREAS: Within normal limits.  ADRENALS: Within normal limits.  KIDNEYS/URETERS: Contrast within the collecting system. No hydronephrosis.    BLADDER: Intraluminal contrast. Unremarkable.  REPRODUCTIVE ORGANS: Prostate is enlarged. With mass effect on the   urinary bladder    BOWEL: No bowel obstruction. Appendix  is normal.  PERITONEUM: No ascites.  VESSELS: Atherosclerotic changes. No abdominal aortic aneurysm.  RETROPERITONEUM/LYMPH NODES: No lymphadenopathy.  ABDOMINAL WALL: Small bilateral fat-containing inguinal hernias..  BONES: Degenerative changes.    IMPRESSION:  No abdominal aortic aneurysm.    ---End of Report ---          SHYLA BARTH DO; Resident Radiologist  This document has been electronically signed.  OLEG ZAMARRIPA MD; Attending Radiologist  This document has been electronically signed. Apr 11 2023 12:38AM    < end of copied text >

## 2023-04-11 NOTE — ED CDU PROVIDER DISPOSITION NOTE - CLINICAL COURSE
53-year-old male with past medical history of hypertension, aortic aneurysm, presents to ED complaining of left-sided chest pain for 2 weeks.  Pain radiates to back and is exertional.  Happened after heavy lifting.  Sent to CDU for echo, stress test and telemetry doc consult.  EKG within normal limits.  CTA chest and abdomen shows a stable aortic aneurysm with no dissection, no PE and no rupture. 53-year-old male with past medical history of hypertension, aortic aneurysm, presents to ED complaining of left-sided chest pain for 2 weeks.  Pain radiates to back and is exertional.  Happened after heavy lifting.  Sent to CDU for echo, stress test and telemetry doc consult.  EKG within normal limits.  CTA chest and abdomen shows a stable aortic aneurysm with no dissection, no PE and no rupture. stress and echo wnl. pt seen by Dr. Maikel Moon and cleared for discharge.

## 2023-04-11 NOTE — CONSULT NOTE ADULT - ASSESSMENT
Patient is a 54 yo man who presents with complaints of left-sided chest pain over the past 2 weeks.    He has a history of hypertension.  He has noted having left-sided chest pain over the prior 2 weeks, which has been intermittent, and radiates to his back.  Notes having an exertional component to his symptoms although not all the time.    12-lead ECG and bedside telemetry --> SR, no events  Cardiac biomarkers flat.    In the ED, patient underwent imaging. CTA chest noted stable aortic root at SOV at 4.0 cm.  No evidence of other aneurysms in chest or abdomen noted.  No evidence of intramural hematoma or dissection.    LE venous duplex US also negative for DVT.    At the time of my evaluation, the patient appeared clinically and hemodynamically stable although BPs were labile/borderline-low.  He was asymptomatic at the time of my evaluation.  Unremarkable physical exam.  He appeared euvolemic on my exam.    Patient already scheduled for echocardiogram and nuclear stress test by the CDU team.    Continue to monitor on cardiac telemetry.  Will monitor BPs -- ideal -120 mmHg in setting of known aneurysm.  Echocardiogram and ischemic evaluation pending.  Will follow.

## 2023-04-11 NOTE — ED CDU PROVIDER SUBSEQUENT DAY NOTE - CLINICAL SUMMARY MEDICAL DECISION MAKING FREE TEXT BOX
53-year-old [place] male with past medical history of hypertension presents to ED complaining of left-sided chest pain for 2 weeks.  Patient states he cannot describe the pain, however pain is intermittent, radiates to his back, exertional and not pleuritic.  States he has had chronic back pain although this back pain feels different.  Has also had chronic right foot swelling intermittently for the past few months. no pe risk factors. EKG NSR. sent to cdu for r/o ACS, tele monitoring, stress test and echo.

## 2023-04-11 NOTE — ED ADULT NURSE REASSESSMENT NOTE - NS ED NURSE REASSESS COMMENT FT1
AAOx4, no signs of distress, pending COVID result for CDU placement, fall precautions maintained
AAOx4, no signs of distress, reports pain improved with medication, COVID swab sent per potential CDU admission, fall precautions maintained
Patient resting in bed, returned from echo, reconnected to cardiac monitor, NSR, no complaints at this time, pending results, fall precautions maintained

## 2023-04-11 NOTE — ED CDU PROVIDER DISPOSITION NOTE - PATIENT PORTAL LINK FT
You can access the FollowMyHealth Patient Portal offered by [facility] by registering at the following website: [email or web address]. By joining [facility]’s FollowMyHealth portal, you will also be able to view your health information using other applications (apps) compatible with our system.

## 2023-04-11 NOTE — ED CDU PROVIDER DISPOSITION NOTE - NSFOLLOWUPINSTRUCTIONS_ED_ALL_ED_FT
Follow up with your PMD within 48-72 hours.    Follow up with Cardiology Dr. Maikel Moon    Worsening, continued or new concerning symptoms return to the Emergency Department.

## 2023-04-11 NOTE — ED CDU PROVIDER SUBSEQUENT DAY NOTE - PHYSICAL EXAMINATION
CONSTITUTIONAL: Well-appearing; well-nourished; in no apparent distress. Non-toxic appearing.   NEURO: Alert & oriented. Gait steady without assistance. Sensory and motor functions are grossly intact.  PSYCH: Mood appropriate. Thought processes intact.   NECK: Supple  CARD: Regular rate and rhythm, no murmurs  RESP: No accessory muscle use; breath sounds clear and equal bilaterally; no wheezes, rhonchi, or rales     ABD: Soft; non-distended; non-tender. No guarding or rebound.   MUSCULOSKELETAL/EXTREMITIES: FROM in all four extremities; no extremity edema.  Back: No obvious deformity, ecchymosis, contusions, or rash. There is no bony tenderness to palpation. No paraspinous muscle tenderness. Negative straight leg raise. ROM intact but painful with flexion/extension.   SKIN: Warm; dry; no apparent lesions or exudate

## 2023-04-11 NOTE — ED CDU PROVIDER SUBSEQUENT DAY NOTE - NS ED ROS FT
ROS:  GENERAL: No fever, no chills  CARDIAC: (+) chest pain  PULMONARY: no cough, no shortness of breath  GI: no abdominal pain, no nausea, no vomiting, no diarrhea, no constipation  : No dysuria, no frequency, no change in appearance or odor of urine  SKIN: no rashes  NEURO: no headache, no weakness, no dizziness  MSK: (+) back pain  All other systems reviewed as negative. As per HPI

## 2023-05-24 ENCOUNTER — APPOINTMENT (OUTPATIENT)
Dept: UROLOGY | Facility: CLINIC | Age: 53
End: 2023-05-24
Payer: COMMERCIAL

## 2023-05-24 VITALS
TEMPERATURE: 97.2 F | HEIGHT: 72 IN | RESPIRATION RATE: 16 BRPM | HEART RATE: 49 BPM | DIASTOLIC BLOOD PRESSURE: 87 MMHG | BODY MASS INDEX: 26.41 KG/M2 | WEIGHT: 195 LBS | SYSTOLIC BLOOD PRESSURE: 144 MMHG | OXYGEN SATURATION: 97 %

## 2023-05-24 PROCEDURE — 99214 OFFICE O/P EST MOD 30 MIN: CPT

## 2023-05-24 NOTE — ASSESSMENT
[FreeTextEntry1] : He should see his primary care physician regarding ankle edema.  Residual urine volume is acceptable.  He will continue with tadalafil 5 mg which was refilled for him.  He could gradually increase it to 20 for erectile dysfunction and then skip the next day.  PSA level, free PSA level and prostate MRI were reviewed.  Images were seen on the computer screen.  False-negative rate of MRI was discussed.  He will repeat PSA level at the next visit.  He can follow-up in 6 to 9 months.  If urinary symptoms worsen in the future, he could try tamsulosin.\par \par Vance Rivera MD, FACS\par [facility]\par  of Urology\par \par [address], [address]\[place] [address]\par \par [address], [address]\[place] [address]\par \par Tel: [phone]\par Fax: [phone]

## 2023-05-24 NOTE — PHYSICAL EXAM
[Urethral Meatus] : meatus normal [Penis Abnormality] : normal uncircumcised penis [Urinary Bladder Findings] : the bladder was normal on palpation [Scrotum] : the scrotum was normal [Testes Tenderness] : no tenderness of the testes [Testes Mass (___cm)] : there were no testicular masses [Prostate Tenderness] : the prostate was not tender [No Prostate Nodules] : no prostate nodules [Prostate Enlarged] : was enlarged [General Appearance - Well Developed] : well developed [General Appearance - Well Nourished] : well nourished [Normal Appearance] : normal appearance [Well Groomed] : well groomed [General Appearance - In No Acute Distress] : no acute distress [Abdomen Soft] : soft [Abdomen Tenderness] : non-tender [Costovertebral Angle Tenderness] : no ~M costovertebral angle tenderness [FreeTextEntry1] : Bilateral ankle edema

## 2023-05-24 NOTE — LETTER BODY
[Dear  ___] : Dear  [unfilled], [Consult Letter:] : I had the pleasure of evaluating your patient, [unfilled]. [Please see my note below.] : Please see my note below. [FreeTextEntry1] : Address: [address] [place] [address] [address], [place] [address]\par Phone: [phone]

## 2023-05-24 NOTE — HISTORY OF PRESENT ILLNESS
[FreeTextEntry1] : He is a 53 year-old man who is seen today in follow-up for urinary symptoms, elevated PSA and erectile dysfunction.  Sometimes he has daytime frequency.  Nocturia however is usually once.  He is on tadalafil 5 mg daily.  He would like to see about the response for erectile function.  In September 2022 PSA level was 4.8 with 23% free PSA.  MRI of the prostate in November 2022 showed an 83 g prostate and no suspicious lesions.  Residual urine volume today was about 100 mL.  In April 2023, to emergency room for chest pain.  CT angiogram of the chest and abdomen and pelvis showed only enlarged prostate.  He has developed ankle edema.\par \par Uroflow previously showed maximum 8, average 4 mL/s, voided 160 cc and the residual urine volume was 120 cc. \par \par Previous notes: Around February 2020, he underwent a CT scan for rib fracture. It showed a large prostate about 5 cm and normal kidneys. He had nocturia 2-3 times, urinary urgency, slow and interrupted urinary stream and sensation of double voiding. He also had difficulty maintaining erections. Desire is normal. PSA level was 2.9 in July 2020. He has history of hyperlipidemia and hypertension.

## 2023-10-27 ENCOUNTER — EMERGENCY (EMERGENCY)
Facility: HOSPITAL | Age: 53
LOS: 1 days | Discharge: HOME | End: 2023-10-27
Attending: EMERGENCY MEDICINE
Payer: COMMERCIAL

## 2023-10-27 VITALS
OXYGEN SATURATION: 98 % | TEMPERATURE: 98 F | WEIGHT: 199.96 LBS | HEART RATE: 60 BPM | HEIGHT: 72 IN | RESPIRATION RATE: 20 BRPM | DIASTOLIC BLOOD PRESSURE: 81 MMHG | SYSTOLIC BLOOD PRESSURE: 146 MMHG

## 2023-10-27 LAB
ALBUMIN SERPL ELPH-MCNC: 3.9 G/DL — SIGNIFICANT CHANGE UP (ref 3.3–5)
ALBUMIN SERPL ELPH-MCNC: 3.9 G/DL — SIGNIFICANT CHANGE UP (ref 3.3–5)
ALP SERPL-CCNC: 106 U/L — SIGNIFICANT CHANGE UP (ref 40–120)
ALP SERPL-CCNC: 106 U/L — SIGNIFICANT CHANGE UP (ref 40–120)
ALT FLD-CCNC: 33 U/L — SIGNIFICANT CHANGE UP (ref 10–45)
ALT FLD-CCNC: 33 U/L — SIGNIFICANT CHANGE UP (ref 10–45)
ANION GAP SERPL CALC-SCNC: 10 MMOL/L — SIGNIFICANT CHANGE UP (ref 5–17)
ANION GAP SERPL CALC-SCNC: 10 MMOL/L — SIGNIFICANT CHANGE UP (ref 5–17)
AST SERPL-CCNC: 21 U/L — SIGNIFICANT CHANGE UP (ref 10–40)
AST SERPL-CCNC: 21 U/L — SIGNIFICANT CHANGE UP (ref 10–40)
BASOPHILS # BLD AUTO: 0.03 K/UL — SIGNIFICANT CHANGE UP (ref 0–0.2)
BASOPHILS # BLD AUTO: 0.03 K/UL — SIGNIFICANT CHANGE UP (ref 0–0.2)
BASOPHILS NFR BLD AUTO: 0.4 % — SIGNIFICANT CHANGE UP (ref 0–2)
BASOPHILS NFR BLD AUTO: 0.4 % — SIGNIFICANT CHANGE UP (ref 0–2)
BILIRUB SERPL-MCNC: 0.4 MG/DL — SIGNIFICANT CHANGE UP (ref 0.2–1.2)
BILIRUB SERPL-MCNC: 0.4 MG/DL — SIGNIFICANT CHANGE UP (ref 0.2–1.2)
BUN SERPL-MCNC: 15 MG/DL — SIGNIFICANT CHANGE UP (ref 7–23)
BUN SERPL-MCNC: 15 MG/DL — SIGNIFICANT CHANGE UP (ref 7–23)
CALCIUM SERPL-MCNC: 9.4 MG/DL — SIGNIFICANT CHANGE UP (ref 8.4–10.5)
CALCIUM SERPL-MCNC: 9.4 MG/DL — SIGNIFICANT CHANGE UP (ref 8.4–10.5)
CHLORIDE SERPL-SCNC: 107 MMOL/L — SIGNIFICANT CHANGE UP (ref 96–108)
CHLORIDE SERPL-SCNC: 107 MMOL/L — SIGNIFICANT CHANGE UP (ref 96–108)
CO2 SERPL-SCNC: 25 MMOL/L — SIGNIFICANT CHANGE UP (ref 22–31)
CO2 SERPL-SCNC: 25 MMOL/L — SIGNIFICANT CHANGE UP (ref 22–31)
CREAT SERPL-MCNC: 1.19 MG/DL — SIGNIFICANT CHANGE UP (ref 0.5–1.3)
CREAT SERPL-MCNC: 1.19 MG/DL — SIGNIFICANT CHANGE UP (ref 0.5–1.3)
EGFR: 73 ML/MIN/1.73M2 — SIGNIFICANT CHANGE UP
EGFR: 73 ML/MIN/1.73M2 — SIGNIFICANT CHANGE UP
EOSINOPHIL # BLD AUTO: 0.19 K/UL — SIGNIFICANT CHANGE UP (ref 0–0.5)
EOSINOPHIL # BLD AUTO: 0.19 K/UL — SIGNIFICANT CHANGE UP (ref 0–0.5)
EOSINOPHIL NFR BLD AUTO: 2.6 % — SIGNIFICANT CHANGE UP (ref 0–6)
EOSINOPHIL NFR BLD AUTO: 2.6 % — SIGNIFICANT CHANGE UP (ref 0–6)
GLUCOSE SERPL-MCNC: 98 MG/DL — SIGNIFICANT CHANGE UP (ref 70–99)
GLUCOSE SERPL-MCNC: 98 MG/DL — SIGNIFICANT CHANGE UP (ref 70–99)
HCT VFR BLD CALC: 47.3 % — SIGNIFICANT CHANGE UP (ref 39–50)
HCT VFR BLD CALC: 47.3 % — SIGNIFICANT CHANGE UP (ref 39–50)
HGB BLD-MCNC: 14.7 G/DL — SIGNIFICANT CHANGE UP (ref 13–17)
HGB BLD-MCNC: 14.7 G/DL — SIGNIFICANT CHANGE UP (ref 13–17)
IMM GRANULOCYTES NFR BLD AUTO: 0.3 % — SIGNIFICANT CHANGE UP (ref 0–0.9)
IMM GRANULOCYTES NFR BLD AUTO: 0.3 % — SIGNIFICANT CHANGE UP (ref 0–0.9)
LYMPHOCYTES # BLD AUTO: 2.31 K/UL — SIGNIFICANT CHANGE UP (ref 1–3.3)
LYMPHOCYTES # BLD AUTO: 2.31 K/UL — SIGNIFICANT CHANGE UP (ref 1–3.3)
LYMPHOCYTES # BLD AUTO: 32.1 % — SIGNIFICANT CHANGE UP (ref 13–44)
LYMPHOCYTES # BLD AUTO: 32.1 % — SIGNIFICANT CHANGE UP (ref 13–44)
MAGNESIUM SERPL-MCNC: 2.1 MG/DL — SIGNIFICANT CHANGE UP (ref 1.6–2.6)
MAGNESIUM SERPL-MCNC: 2.1 MG/DL — SIGNIFICANT CHANGE UP (ref 1.6–2.6)
MCHC RBC-ENTMCNC: 25.7 PG — LOW (ref 27–34)
MCHC RBC-ENTMCNC: 25.7 PG — LOW (ref 27–34)
MCHC RBC-ENTMCNC: 31.1 GM/DL — LOW (ref 32–36)
MCHC RBC-ENTMCNC: 31.1 GM/DL — LOW (ref 32–36)
MCV RBC AUTO: 82.5 FL — SIGNIFICANT CHANGE UP (ref 80–100)
MCV RBC AUTO: 82.5 FL — SIGNIFICANT CHANGE UP (ref 80–100)
MONOCYTES # BLD AUTO: 0.72 K/UL — SIGNIFICANT CHANGE UP (ref 0–0.9)
MONOCYTES # BLD AUTO: 0.72 K/UL — SIGNIFICANT CHANGE UP (ref 0–0.9)
MONOCYTES NFR BLD AUTO: 10 % — SIGNIFICANT CHANGE UP (ref 2–14)
MONOCYTES NFR BLD AUTO: 10 % — SIGNIFICANT CHANGE UP (ref 2–14)
NEUTROPHILS # BLD AUTO: 3.93 K/UL — SIGNIFICANT CHANGE UP (ref 1.8–7.4)
NEUTROPHILS # BLD AUTO: 3.93 K/UL — SIGNIFICANT CHANGE UP (ref 1.8–7.4)
NEUTROPHILS NFR BLD AUTO: 54.6 % — SIGNIFICANT CHANGE UP (ref 43–77)
NEUTROPHILS NFR BLD AUTO: 54.6 % — SIGNIFICANT CHANGE UP (ref 43–77)
NRBC # BLD: 0 /100 WBCS — SIGNIFICANT CHANGE UP (ref 0–0)
NRBC # BLD: 0 /100 WBCS — SIGNIFICANT CHANGE UP (ref 0–0)
NT-PROBNP SERPL-SCNC: <36 PG/ML — SIGNIFICANT CHANGE UP (ref 0–300)
NT-PROBNP SERPL-SCNC: <36 PG/ML — SIGNIFICANT CHANGE UP (ref 0–300)
PLATELET # BLD AUTO: 312 K/UL — SIGNIFICANT CHANGE UP (ref 150–400)
PLATELET # BLD AUTO: 312 K/UL — SIGNIFICANT CHANGE UP (ref 150–400)
POTASSIUM SERPL-MCNC: 3.8 MMOL/L — SIGNIFICANT CHANGE UP (ref 3.5–5.3)
POTASSIUM SERPL-MCNC: 3.8 MMOL/L — SIGNIFICANT CHANGE UP (ref 3.5–5.3)
POTASSIUM SERPL-SCNC: 3.8 MMOL/L — SIGNIFICANT CHANGE UP (ref 3.5–5.3)
POTASSIUM SERPL-SCNC: 3.8 MMOL/L — SIGNIFICANT CHANGE UP (ref 3.5–5.3)
PROT SERPL-MCNC: 7.1 G/DL — SIGNIFICANT CHANGE UP (ref 6–8.3)
PROT SERPL-MCNC: 7.1 G/DL — SIGNIFICANT CHANGE UP (ref 6–8.3)
RBC # BLD: 5.73 M/UL — SIGNIFICANT CHANGE UP (ref 4.2–5.8)
RBC # BLD: 5.73 M/UL — SIGNIFICANT CHANGE UP (ref 4.2–5.8)
RBC # FLD: 14 % — SIGNIFICANT CHANGE UP (ref 10.3–14.5)
RBC # FLD: 14 % — SIGNIFICANT CHANGE UP (ref 10.3–14.5)
SODIUM SERPL-SCNC: 142 MMOL/L — SIGNIFICANT CHANGE UP (ref 135–145)
SODIUM SERPL-SCNC: 142 MMOL/L — SIGNIFICANT CHANGE UP (ref 135–145)
TROPONIN T, HIGH SENSITIVITY RESULT: 9 NG/L — SIGNIFICANT CHANGE UP (ref 0–51)
WBC # BLD: 7.2 K/UL — SIGNIFICANT CHANGE UP (ref 3.8–10.5)
WBC # BLD: 7.2 K/UL — SIGNIFICANT CHANGE UP (ref 3.8–10.5)
WBC # FLD AUTO: 7.2 K/UL — SIGNIFICANT CHANGE UP (ref 3.8–10.5)
WBC # FLD AUTO: 7.2 K/UL — SIGNIFICANT CHANGE UP (ref 3.8–10.5)

## 2023-10-27 PROCEDURE — 71045 X-RAY EXAM CHEST 1 VIEW: CPT | Mod: 26

## 2023-10-27 PROCEDURE — 99223 1ST HOSP IP/OBS HIGH 75: CPT

## 2023-10-27 PROCEDURE — 75574 CT ANGIO HRT W/3D IMAGE: CPT | Mod: 26,MA

## 2023-10-27 RX ORDER — ASPIRIN/CALCIUM CARB/MAGNESIUM 324 MG
324 TABLET ORAL ONCE
Refills: 0 | Status: COMPLETED | OUTPATIENT
Start: 2023-10-27 | End: 2023-10-27

## 2023-10-27 RX ADMIN — Medication 324 MILLIGRAM(S): at 11:53

## 2023-10-27 NOTE — ED PROVIDER NOTE - OTHER FINDINGS
ECG recorded at 953 independently interpreted by me at 1111 and shows normal sinus rhythm normal axis normal intervals T wave flattening lead aVL no ST elevation or ST depression.  Compared with ECG dated 4/10/2023: No significant changes.

## 2023-10-27 NOTE — ED CDU PROVIDER INITIAL DAY NOTE - PROGRESS NOTE DETAILS
Antonette Villasenor M.D. (Resident Physician): Discussed CTA coronaries result with Pt's Cardiologist Dr. James Laboy. Pt still waiting on echo. If the echo looks okay, pt can follow-up outpatient with Dr. Laboy at [facility] on Tuesday.

## 2023-10-27 NOTE — ED ADULT NURSE REASSESSMENT NOTE - NS ED NURSE REASSESS COMMENT FT1
pt Breathing spontaneous and unlabored on room air, Skin warm and dry and of color appropriate for ethnicity , moves all extremities, speech clear. Repeat troponin sent. pending lab results. no further nurse intervention needed at this time.

## 2023-10-27 NOTE — ED ADULT NURSE REASSESSMENT NOTE - NS ED NURSE REASSESS COMMENT FT1
07.00 Received the Pt from  CLAUS Mercado  Pt is Observed for CP for CTC & ECHO . CTC done awaiting results . Received the Pt A&OX 4  Pt obeys commands Eugenia N/V/D fever chills cp SOB   Comfort care & safety measures continued  IV site looks clean & dry no signs of infiltration noted pt denies  pain IV site .Pt is oriented to the unit Plan of care explained .  Pt is advised to call for help  call bell with in the reach pt verbalized the understanding .  pending CDU  MD bean . GCS 15/15 A&OX 4 PERRLA  size 3 Strong upper & lower extremities steady gait  Pt is ambulatory & independent   No facial droop  No Hand Leg drop denies numbness tingling. pt is Sinus Roberth 56 on the monitor   Plan of care ongoing

## 2023-10-27 NOTE — ED CDU PROVIDER INITIAL DAY NOTE - OBJECTIVE STATEMENT
52 y/o M with PMHx of HTN, thoracic AA presents to the ED for 2 weeks of midsternal chest pain.  Patient states he had worsening chest pain over the last day, pain is pressure-like in nature.  Pain is worse with exertion with associated dyspnea on exertion.  Patient states he has seen a cardiologist in the past but has not had a stress echo in a few years.  Patient denies radiation of pain.  Patient denies fevers, chills, headache, vision changes, palpitations, abdominal pain, nausea/vomiting, diarrhea/constipation, dysuria, hematuria.  Patient denies leg pain or swelling.  ED Course: Given asa. Labs unactionable. CXR with mild vascular congestion. Given moderate risk for ACS planned to go CDU for CTC and Echo for further cardiac eval. 52 y/o M with PMHx of HTN, thoracic AA presents to the ED for 2 weeks of midsternal chest pain.  Patient states he had worsening chest pain over the last day, pain is pressure-like in nature.  Pain is worse with exertion with associated dyspnea on exertion.  Patient states he has seen a cardiologist in the past but has not had a stress echo in a few years.  Patient denies radiation of pain.  Patient denies fevers, chills, headache, vision changes, palpitations, abdominal pain, nausea/vomiting, diarrhea/constipation, dysuria, hematuria.  Patient denies leg pain or swelling.  ED Course: Given asa. Labs unactionable. CXR with mild vascular congestion. Given moderate risk for ACS planned to go CDU for CTC and Echo for further cardiac eval.  Cardiologist: [place], Dr. Bro

## 2023-10-27 NOTE — ED PROVIDER NOTE - PHYSICAL EXAMINATION
Constitutional: VS reviewed. Alert and orientedx3, well appearing, no apparent distress  HEENT: Atraumatic, EOMI, PERRL  CV: RRR, no TTP of chest wall  Lungs: Clear and equal bilaterally, no wheezes, rales or crackles  Abdomen: Soft, nondistended, nontender  MSK: No deformities  Skin: Warm and dry. As visualized no rashes, lesions, bruising or erythema  Neuro: Appears nonfocal  Lymph: No pitting edema in extremities.

## 2023-10-27 NOTE — ED ADULT NURSE NOTE - OBJECTIVE STATEMENT
54 y/o M with PMHx HTN and GERD with c/o chest pressure. Pt states he had chest pain in the past for the past two week s has noticed increased chest pressure that is worse when walking and SOB upon exertion. pt stated he was seen by Dr Bro in January who mentioned to him he had a dilated artery. pt is  A&Ox4 able to follow all commands. Pulse, motor, sensation present and equal in all 4 extremities. Denies HA, dizziness, blurry vision. Respirations spontaneous and unlabored on room air. Denies dyspnea, cough,  palpitations. EKG done. On CM, NSR. Denies abd pain, N/V/D/C. Abd soft NT/ND.  Denies urinary symptoms. Denies fever, chills. No sick contacts. Skin intact, warm, dry, normal for race.

## 2023-10-27 NOTE — ED PROVIDER NOTE - CLINICAL SUMMARY MEDICAL DECISION MAKING FREE TEXT BOX
54 y/o M with PMHx of HTN, thoracic AA presents to the ED for 2 weeks of midsternal chest pain, worse over last day. + APPLE. Initial EKG shows sinus erica in 50s. Pain pressure like and exertional. Equal pulses b/l. Low concern for worsening of AA at this time as pain more like pressure and worse on exertion. HEART score of 3. Low concern for PE at this time. Plan for labs, EKG, CXR. Dispo pending labs and imaging, likely CDU vs admission to medicine for further cardiac testing.

## 2023-10-27 NOTE — ED CDU PROVIDER INITIAL DAY NOTE - PHYSICAL EXAMINATION
CONSTITUTIONAL: Well-developed; well-nourished; in no acute distress.   SKIN: warm, dry  HEAD: Normocephalic; atraumatic.  EYES: no conjunctival injection. no scleral icterus  ENT: No nasal discharge; airway clear.  CARD: S1, S2 normal; no murmurs, gallops, or rubs. Regular rate and rhythm.   RESP: No wheezes, rales or rhonchi. Good air movement bilaterally.   ABD: soft ntnd, no guarding, no distention, no rigidity.   EXT: Ambulates independently.  No cyanosis or edema.   NEURO: Alert, oriented, grossly unremarkable  PSYCH: Cooperative, appropriate.

## 2023-10-27 NOTE — ED PROVIDER NOTE - OBJECTIVE STATEMENT
52 y/o M with PMHx of HTN, thoracic AA presents to the ED for 2 weeks of midsternal chest pain.  Patient states he had worsening chest pain over the last day, pain is pressure-like in nature.  Pain is worse with exertion with associated dyspnea on exertion.  Patient states he has seen a cardiologist in the past but has not had a stress echo in a few years.  Patient denies radiation of pain.  Patient denies fevers, chills, headache, vision changes, palpitations, abdominal pain, nausea/vomiting, diarrhea/constipation, dysuria, hematuria.  Patient denies leg pain or swelling.

## 2023-10-27 NOTE — ED PROVIDER NOTE - ATTENDING CONTRIBUTION TO CARE
Attending MD Gallagher:  I personally have seen and examined this patient. I have performed a substantive portion of the visit including all aspects of the medical decision making.  Resident note reviewed and agree on plan of care and except where noted.      53-year-old gentleman with a history of hypertension, mild thoracic aortic aneurysm presenting for evaluation of 2 weeks of constant central chest pressure.  Patient states pain and pressure is always there but seem to be worse with exertion however.  He has associated exertional shortness of breath.  He always has back pain and denies any worsening of back pain.  No nausea vomiting or diaphoresis.  Patient admits to pain during interview.    Patient's vital signs are nonactionable.  He is sitting in the stretcher comfortably.  Radial and DP pulses are palpable extremities warm and well-perfused.  Regular heart sounds with clear lungs anteriorly.    ECG reviewed and is unchanged from priors without any diagnostic ischemic findings.    Patient with acute chest pain, no diagnostic ischemic findings on ECG.  Patient however with possible typical features for cardiac ischemia given exertional nature of pain pressure-like nature of pain and associated exertional shortness of breath.  We will obtain cardiac biomarkers to exclude MI.  Given concerning story however I think patient would warrant further ischemic evaluation with likely coronary CT scan.  I considered aortic dissection especially in light of patient's known borderline thoracic aortic aneurysm and clinically I do not suspect dissection in this patient.  Clinical history is also not consistent with pulmonary embolism.       *The above represents an initial assessment/impression. Please refer to progress notes for potential changes in patient clinical course*

## 2023-10-27 NOTE — ED PROVIDER NOTE - PRINCIPAL DIAGNOSIS
Prior Authorization Retail Medication Request    Medication/Dose: Denavir 1% cream    Key: [id number]    ICD code (if different than what is on RX):    Previously Tried and Failed:    Rationale:      Insurance Name: [facility]      Insurance ID:  [id number]      Pharmacy Information (if different than what is on RX)  Name:    Phone:      Chest pain with moderate risk of acute coronary syndrome

## 2023-10-27 NOTE — ED ADULT NURSE NOTE - DISTAL EXTREMITY CAPILLARY REFILL
Glycopyrrolate Counseling:  I discussed with the patient the risks of glycopyrrolate including but not limited to skin rash, drowsiness, dry mouth, difficulty urinating, and blurred vision. 2 seconds or less

## 2023-10-27 NOTE — ED ADULT NURSE NOTE - HIV OFFER
Wounds to RLE and LLE managed by facility wound care team  Dressings clean and intact   Previously Declined (within the last year)

## 2023-10-27 NOTE — ED CDU PROVIDER INITIAL DAY NOTE - CLINICAL SUMMARY MEDICAL DECISION MAKING FREE TEXT BOX
54 y/o M with PMHx of HTN, thoracic AA presents to the ED for 2 weeks of midsternal chest pain. With ekg with lateral TW flattening, nsr. Labs unactionable, first trop 9, repeat pending. GIven moderate ACS risk plan for CDU for CTC and Echo to evaluate. Clinically unlikely pe or dissection. - Ye Pacheco MD PGY2

## 2023-10-27 NOTE — ED CDU PROVIDER INITIAL DAY NOTE - ATTENDING CONTRIBUTION TO CARE
Attending MD Gallagher:  I personally have seen and examined this patient. I have performed a substantive portion of the visit including all aspects of the medical decision making.  Resident note reviewed and agree on plan of care and except where noted.          *The above represents an initial assessment/impression. Please refer to progress notes for potential changes in patient clinical course*

## 2023-10-28 VITALS
SYSTOLIC BLOOD PRESSURE: 133 MMHG | RESPIRATION RATE: 18 BRPM | TEMPERATURE: 98 F | DIASTOLIC BLOOD PRESSURE: 83 MMHG | HEART RATE: 57 BPM | OXYGEN SATURATION: 96 %

## 2023-10-28 PROCEDURE — 99285 EMERGENCY DEPT VISIT HI MDM: CPT | Mod: 25

## 2023-10-28 PROCEDURE — 99238 HOSP IP/OBS DSCHRG MGMT 30/<: CPT

## 2023-10-28 PROCEDURE — 80053 COMPREHEN METABOLIC PANEL: CPT

## 2023-10-28 PROCEDURE — 83735 ASSAY OF MAGNESIUM: CPT

## 2023-10-28 PROCEDURE — C8929: CPT

## 2023-10-28 PROCEDURE — 75574 CT ANGIO HRT W/3D IMAGE: CPT | Mod: MA

## 2023-10-28 PROCEDURE — 36415 COLL VENOUS BLD VENIPUNCTURE: CPT

## 2023-10-28 PROCEDURE — 83880 ASSAY OF NATRIURETIC PEPTIDE: CPT

## 2023-10-28 PROCEDURE — 93005 ELECTROCARDIOGRAM TRACING: CPT | Mod: XU

## 2023-10-28 PROCEDURE — G0378: CPT

## 2023-10-28 PROCEDURE — 93306 TTE W/DOPPLER COMPLETE: CPT | Mod: 26

## 2023-10-28 PROCEDURE — 84484 ASSAY OF TROPONIN QUANT: CPT

## 2023-10-28 PROCEDURE — 85025 COMPLETE CBC W/AUTO DIFF WBC: CPT

## 2023-10-28 PROCEDURE — 71045 X-RAY EXAM CHEST 1 VIEW: CPT

## 2023-10-28 NOTE — ED CDU PROVIDER SUBSEQUENT DAY NOTE - CLINICAL SUMMARY MEDICAL DECISION MAKING FREE TEXT BOX
54 y/o M with PMHx of HTN, thoracic AA presents to the ED for 2 weeks of midsternal chest pain. With ekg with lateral TW flattening, nsr. Labs unactionable, first trop 9, repeat 9. CTA coronaries shows non-calcifies plaque stenosis, unchanged aortic root and ascending aorta dilation, and prominent left ventricular trabeculations. Echo pending to eval cardiac function.

## 2023-10-28 NOTE — ED CDU PROVIDER SUBSEQUENT DAY NOTE - ATTENDING APP SHARED VISIT CONTRIBUTION OF CARE
53-year-old male, past medical history of hypertension and thoracic aortic aneurysm, presented to ED complaining of 2 weeks of midsternal chest pain, which worsened the day prior.  Patient stated pain was worse with exertion and associated with dyspnea on exertion.  In the ED, work-up was on actionable, with stable troponins.  EKG is without ischemic changes.  CT angio coronary showing 50% non-calcified plaque stenosis of the proximal LAD.  Clinical course discussed with patient's cardiologist, Dr. Laboy.  Patient recommended to come to CDU for echocardiogram.  Patient without any acute events overnight.  Patient states pain improved for now.  Exam is unremarkable and unchanged from prior.  Will await echocardiogram and discussed with patient's outpatient cardiologist.

## 2023-10-28 NOTE — ED CDU PROVIDER SUBSEQUENT DAY NOTE - PROGRESS NOTE DETAILS
GHANSHYAM Traore: Patient seen at bedside in NAD.  VSS.  Patient resting comfortably without complaints. no events over tele. TTE unremarkable will have pt f/u outpt cards

## 2023-10-28 NOTE — ED CDU PROVIDER DISPOSITION NOTE - ATTENDING CONTRIBUTION TO CARE
53-year-old male, past medical history of hypertension and thoracic aortic aneurysm, presented to ED complaining of 2 weeks of midsternal chest pain, which worsened the day prior.  Patient stated pain was worse with exertion and associated with dyspnea on exertion.  In the ED, work-up was on actionable, with stable troponins. EKG is without ischemic changes.  CT angio coronary showing 50% non-calcified plaque stenosis of the proximal LAD.  Clinical course discussed with patient's cardiologist, Dr. Laboy.  Patient recommended to come to CDU for echocardiogram. Echocardiogram performed and discussed with patient's cardiologist, Dr. Laboy. Per him, patient is cleared for close outpatient follow up with him. Discharge exam unremarkable. VSS. Patient states feeling improved. Plan discussed with patient who expressed understanding. Return precautions given. All questions answered prior to discharge.

## 2023-10-28 NOTE — ED CDU PROVIDER DISPOSITION NOTE - PATIENT PORTAL LINK FT
You can access the FollowMyHealth Patient Portal offered by [facility] by registering at the following website: [email or web address]. By joining [employer]’s FollowMyHealth portal, you will also be able to view your health information using other applications (apps) compatible with our system.

## 2023-10-28 NOTE — ED CDU PROVIDER SUBSEQUENT DAY NOTE - HISTORY
Pt stable. CTA coronaries showed moderate approx 50% non-calcified plaque stenosis in the proximal LAD. Dilated aortic root and ascending aorta relatively unchanged from 04/2023 CT. Prominent left ventricular trabeculations. Discussed with pt's cardiologist Dr. Laboy and he wants to see pt outpatient on Tuesday unless pending echo shows any concerns.

## 2023-10-28 NOTE — ED CDU PROVIDER DISPOSITION NOTE - CLINICAL COURSE
52 y/o M with PMHx of HTN, thoracic AA presents to the ED for 2 weeks of midsternal chest pain.  Patient states he had worsening chest pain over the last day, pain is pressure-like in nature.  Pain is worse with exertion with associated dyspnea on exertion.  Patient states he has seen a cardiologist in the past but has not had a stress echo in a few years.  Patient denies radiation of pain.  Patient denies fevers, chills, headache, vision changes, palpitations, abdominal pain, nausea/vomiting, diarrhea/constipation, dysuria, hematuria.  Patient denies leg pain or swelling.  	ED Course: Given asa. Labs unactionable. CXR with mild vascular congestion. Given moderate risk for ACS planned to go CDU for CTC and Echo for further cardiac eval.     CT coronary performed which showed 50% LAD stenosis. cards recommended outpt f/u. no events over tele. TTE unremarkable will have pt f/u outpt cards. cleared for discharge home

## 2023-11-22 ENCOUNTER — APPOINTMENT (OUTPATIENT)
Dept: UROLOGY | Facility: CLINIC | Age: 53
End: 2023-11-22
Payer: COMMERCIAL

## 2023-11-22 VITALS
HEIGHT: 72 IN | WEIGHT: 195 LBS | BODY MASS INDEX: 26.41 KG/M2 | HEART RATE: 61 BPM | DIASTOLIC BLOOD PRESSURE: 79 MMHG | OXYGEN SATURATION: 98 % | SYSTOLIC BLOOD PRESSURE: 137 MMHG

## 2023-11-22 DIAGNOSIS — R35.1 BENIGN PROSTATIC HYPERPLASIA WITH LOWER URINARY TRACT SYMPMS: ICD-10-CM

## 2023-11-22 DIAGNOSIS — N40.1 BENIGN PROSTATIC HYPERPLASIA WITH LOWER URINARY TRACT SYMPMS: ICD-10-CM

## 2023-11-22 DIAGNOSIS — R97.20 ELEVATED PROSTATE, SPECIFIC ANTIGEN [PSA]: ICD-10-CM

## 2023-11-22 DIAGNOSIS — N52.9 MALE ERECTILE DYSFUNCTION, UNSPECIFIED: ICD-10-CM

## 2023-11-22 PROCEDURE — 51736 URINE FLOW MEASUREMENT: CPT

## 2023-11-22 PROCEDURE — 99214 OFFICE O/P EST MOD 30 MIN: CPT

## 2023-11-22 RX ORDER — TAMSULOSIN HYDROCHLORIDE 0.4 MG/1
0.4 CAPSULE ORAL
Qty: 90 | Refills: 3 | Status: ACTIVE | COMMUNITY
Start: 2023-11-22 | End: 1900-01-01

## 2023-11-22 RX ORDER — TADALAFIL 5 MG/1
5 TABLET ORAL
Qty: 90 | Refills: 1 | Status: ACTIVE | COMMUNITY
Start: 2020-07-27 | End: 1900-01-01

## 2023-11-23 LAB — PSA SERPL-MCNC: 5 NG/ML

## 2024-05-23 ENCOUNTER — APPOINTMENT (OUTPATIENT)
Dept: UROLOGY | Facility: CLINIC | Age: 54
End: 2024-05-23

## 2024-10-25 ENCOUNTER — APPOINTMENT (OUTPATIENT)
Dept: UROLOGY | Facility: CLINIC | Age: 54
End: 2024-10-25
Payer: MEDICAID

## 2024-10-25 DIAGNOSIS — R35.1 BENIGN PROSTATIC HYPERPLASIA WITH LOWER URINARY TRACT SYMPMS: ICD-10-CM

## 2024-10-25 DIAGNOSIS — N52.9 MALE ERECTILE DYSFUNCTION, UNSPECIFIED: ICD-10-CM

## 2024-10-25 DIAGNOSIS — N40.1 BENIGN PROSTATIC HYPERPLASIA WITH LOWER URINARY TRACT SYMPMS: ICD-10-CM

## 2024-10-25 DIAGNOSIS — R97.20 ELEVATED PROSTATE, SPECIFIC ANTIGEN [PSA]: ICD-10-CM

## 2024-10-25 PROCEDURE — 99214 OFFICE O/P EST MOD 30 MIN: CPT

## 2024-10-25 PROCEDURE — G2211 COMPLEX E/M VISIT ADD ON: CPT | Mod: NC

## 2025-04-03 ENCOUNTER — APPOINTMENT (OUTPATIENT)
Dept: UROLOGY | Facility: CLINIC | Age: 55
End: 2025-04-03
Payer: COMMERCIAL

## 2025-04-03 VITALS
SYSTOLIC BLOOD PRESSURE: 158 MMHG | RESPIRATION RATE: 17 BRPM | TEMPERATURE: 97.7 F | OXYGEN SATURATION: 100 % | BODY MASS INDEX: 26.41 KG/M2 | WEIGHT: 195 LBS | HEIGHT: 72 IN | HEART RATE: 55 BPM | DIASTOLIC BLOOD PRESSURE: 95 MMHG

## 2025-04-03 DIAGNOSIS — R35.1 BENIGN PROSTATIC HYPERPLASIA WITH LOWER URINARY TRACT SYMPMS: ICD-10-CM

## 2025-04-03 DIAGNOSIS — N40.1 BENIGN PROSTATIC HYPERPLASIA WITH LOWER URINARY TRACT SYMPMS: ICD-10-CM

## 2025-04-03 DIAGNOSIS — R97.20 ELEVATED PROSTATE, SPECIFIC ANTIGEN [PSA]: ICD-10-CM

## 2025-04-03 DIAGNOSIS — N52.9 MALE ERECTILE DYSFUNCTION, UNSPECIFIED: ICD-10-CM

## 2025-04-03 PROCEDURE — 99214 OFFICE O/P EST MOD 30 MIN: CPT

## 2025-04-03 PROCEDURE — G2211 COMPLEX E/M VISIT ADD ON: CPT

## 2025-04-25 ENCOUNTER — APPOINTMENT (OUTPATIENT)
Dept: UROLOGY | Facility: CLINIC | Age: 55
End: 2025-04-25